# Patient Record
Sex: FEMALE | Race: WHITE
[De-identification: names, ages, dates, MRNs, and addresses within clinical notes are randomized per-mention and may not be internally consistent; named-entity substitution may affect disease eponyms.]

---

## 2018-02-13 ENCOUNTER — HOSPITAL ENCOUNTER (OUTPATIENT)
Dept: HOSPITAL 68 - STS | Age: 58
End: 2018-02-13
Attending: UROLOGY
Payer: COMMERCIAL

## 2018-02-13 VITALS — HEIGHT: 64 IN | BODY MASS INDEX: 25.61 KG/M2 | WEIGHT: 150 LBS

## 2018-02-13 DIAGNOSIS — N20.0: Primary | ICD-10-CM

## 2018-02-13 DIAGNOSIS — F17.200: ICD-10-CM

## 2018-02-13 NOTE — OPERATIVE REPORT
Operative/Inv Procedure Report
Surgery Date: 02/13/18
Name of Procedure:
RIGHT renal eswl, fluoroscopy
Pre-Operative Diagnosis:
Right renal stone
Post-Operative Diagnosis:
same
Estimated Blood Loss: scant
Surgeon/Assistant:
Will Carreon MD
 
Anesthesia: moderate sedation
Specimens:
none
Complications:
none
 
Operative/Procedure Note
Note:
The patient was taken to the operating room placed on the OR table in supine 
position.  Timeout was performed, with the patient awake, in order to confirm 
correct procedure, laterality, anesthesia, and other pertinent perioperative 
information.  Previously, the right URETER stone was lithotrypsied in order to 
relieve her pain and hydronephrosis.
 
After adequate anesthesia and antibiotics, the patient was then positioned over 
the ESWL table cutout overlying the treatment dome.  Fluoroscopy, using AP and 
oblique views, as well as renal ultrasound, or performed in order to locate the 
stone.  The position of the RIGHT renal stone was optimized, and positioned in 
the middle of the ESWL crosshairs.  The stone was measured to be approximately 9
mm in size.  ESWL was initiated at low power, and after 200 shockwaves delivered
, noting the patient's tolerance to the shockwaves, the power was increased to 
maximum.  At the end of 2500 shockwaves, fluoroscopy confirms the change in 
consistency of the stone, indicating shattering of the stone. 
 
All sponge needle and instrument count were correct at the end of the case.  The
patient tolerated the procedures well, and was taken to the recovery room in 
satisfactory condition.  The patient is discharged home with pain medication, 
and follow-up instructions with in 2-3 weeks' time.
Findings:
no stents bilat. 
Discharge Disposition: Same Day Admissions
Additional Comments:
f/u KUB and renal US in 2-4 weeks.
CC:
Will Carreon MD